# Patient Record
Sex: MALE | Race: ASIAN | NOT HISPANIC OR LATINO | Employment: OTHER | URBAN - METROPOLITAN AREA
[De-identification: names, ages, dates, MRNs, and addresses within clinical notes are randomized per-mention and may not be internally consistent; named-entity substitution may affect disease eponyms.]

---

## 2021-06-11 ENCOUNTER — APPOINTMENT (OUTPATIENT)
Dept: RADIOLOGY | Facility: CLINIC | Age: 76
End: 2021-06-11
Payer: COMMERCIAL

## 2021-06-11 ENCOUNTER — OFFICE VISIT (OUTPATIENT)
Dept: OBGYN CLINIC | Facility: CLINIC | Age: 76
End: 2021-06-11
Payer: COMMERCIAL

## 2021-06-11 VITALS
BODY MASS INDEX: 25.66 KG/M2 | HEIGHT: 65 IN | SYSTOLIC BLOOD PRESSURE: 140 MMHG | DIASTOLIC BLOOD PRESSURE: 84 MMHG | WEIGHT: 154 LBS

## 2021-06-11 DIAGNOSIS — M25.562 PAIN IN BOTH KNEES, UNSPECIFIED CHRONICITY: ICD-10-CM

## 2021-06-11 DIAGNOSIS — M17.0 PRIMARY LOCALIZED OSTEOARTHRITIS OF KNEES, BILATERAL: Primary | ICD-10-CM

## 2021-06-11 DIAGNOSIS — M25.561 PAIN IN BOTH KNEES, UNSPECIFIED CHRONICITY: ICD-10-CM

## 2021-06-11 PROCEDURE — 99213 OFFICE O/P EST LOW 20 MIN: CPT | Performed by: ORTHOPAEDIC SURGERY

## 2021-06-11 PROCEDURE — 73564 X-RAY EXAM KNEE 4 OR MORE: CPT

## 2021-06-11 NOTE — ASSESSMENT & PLAN NOTE
Findings consistent with bilateral knee osteoarthritis  Findings and treatment options were discussed with the patient and his son  X-rays were reviewed with them  Discussed conservative treatment options of topical NSAIDs, icing and low-impact exercises  Offered cortisone injections to the bilateral knee joints, but patient will hold off on this time since pain is not on a consistent basis  He is to try to avoid repetitive stair climbing as well  Follow-up as needed if symptoms worsen  All questions were answered to their satisfaction

## 2021-06-11 NOTE — PROGRESS NOTES
Assessment:     1  Primary localized osteoarthritis of knees, bilateral        Plan:    The patient was seen and examined by Dr Michael Snow and myself  Problem List Items Addressed This Visit        Musculoskeletal and Integument    Primary localized osteoarthritis of knees, bilateral - Primary      Findings consistent with bilateral knee osteoarthritis  Findings and treatment options were discussed with the patient and his son  X-rays were reviewed with them  Discussed conservative treatment options of topical NSAIDs, icing and low-impact exercises  Offered cortisone injections to the bilateral knee joints, but patient will hold off on this time since pain is not on a consistent basis  He is to try to avoid repetitive stair climbing as well  Follow-up as needed if symptoms worsen  All questions were answered to their satisfaction  Relevant Orders    XR knee 4+ vw left injury    XR knee 4+ vw right injury         Subjective:     Patient ID: Meet Black is a 77 y o  male  Chief Complaint: This is a 50-year-old [place] ([facility]) male accompanied by his son who is translating for him  He is here for evaluation of his bilateral knees  He is ambulating with no assistance  Patient has been having aching pain intermittently in his knees for the past month  The pain started after he was visiting at a relative's house where he was repetitively climbing stairs  Pain is localized anteriorly over his knees and radiates down the lateral aspect of his leg  The pain gets as worse as 5/10  No treatment as of yet  He is not taking any over-the-counter medications  He has a history of hypertension  Patient intake form was reviewed today       Allergy:  No Known Allergies  Medications:  all current active meds have been reviewed  Past Medical History:  Past Medical History:   Diagnosis Date    Diabetes mellitus ([facility] [address] )     Hypertension     Prostate cancer [facility])      Past Surgical History:  Past Surgical History: Procedure Laterality Date    PROSTATE SURGERY       Family History:  No family history on file  Social History:  Social History     Substance and Sexual Activity   Alcohol Use Never    Frequency: Never     Social History     Substance and Sexual Activity   Drug Use Not on file     Social History     Tobacco Use   Smoking Status Never Smoker   Smokeless Tobacco Never Used     Review of Systems   Constitutional: Negative  HENT: Negative  Eyes: Negative  Respiratory: Negative  Cardiovascular: Negative  Gastrointestinal: Negative  Endocrine: Negative  Genitourinary: Negative  Musculoskeletal: Positive for arthralgias  Skin: Negative  Neurological: Negative  Hematological: Negative  Psychiatric/Behavioral: Negative  Objective:  BP Readings from Last 1 Encounters:   06/11/21 140/84      Wt Readings from Last 1 Encounters:   06/11/21 69 9 kg (154 lb)      BMI:   Estimated body mass index is 25 63 kg/m² as calculated from the following:    Height as of this encounter: 5' 5" (1 651 m)  Weight as of this encounter: 69 9 kg (154 lb)  BSA:   Estimated body surface area is 1 77 meters squared as calculated from the following:    Height as of this encounter: 5' 5" (1 651 m)  Weight as of this encounter: 69 9 kg (154 lb)  Physical Exam  Constitutional:       General: He is not in acute distress  Appearance: He is well-developed  HENT:      Head: Normocephalic  Eyes:      Conjunctiva/sclera: Conjunctivae normal       Pupils: Pupils are equal, round, and reactive to light  Pulmonary:      Effort: Pulmonary effort is normal  No respiratory distress  Musculoskeletal:      Right knee: He exhibits no effusion  Left knee: He exhibits no effusion  Skin:     General: Skin is warm and dry  Neurological:      Mental Status: He is alert and oriented to person, place, and time     Psychiatric:         Behavior: Behavior normal        Right Knee Exam     Tenderness The patient is experiencing no tenderness  Range of Motion   The patient has normal right knee ROM  Tests   Ailyn:  Medial - negative Lateral - negative  Varus: negative Valgus: negative    Other   Erythema: absent  Scars: absent  Sensation: normal  Pulse: present  Swelling: none  Effusion: no effusion present    Comments:    Patellofemoral crepitation  Genu varum      Left Knee Exam     Tenderness   The patient is experiencing no tenderness  Range of Motion   The patient has normal left knee ROM  Tests   Ailyn:  Medial - negative Lateral - negative  Varus: negative Valgus: negative    Other   Erythema: absent  Scars: absent  Sensation: normal  Pulse: present  Swelling: none  Effusion: no effusion present    Comments:   Patellofemoral crepitation  Genu varum            I have personally reviewed pertinent films in PACS and my interpretation is X-rays of bilateral knees reveal severe tricompartmental osteoarthritis worse in the medial and patellofemoral compartments with marginal osteophyte formation and varus alignment

## 2024-01-27 ENCOUNTER — HOSPITAL ENCOUNTER (EMERGENCY)
Dept: HOSPITAL 99 - EMR | Age: 79
Discharge: HOME | End: 2024-01-27
Payer: COMMERCIAL

## 2024-01-27 VITALS — RESPIRATION RATE: 18 BRPM | SYSTOLIC BLOOD PRESSURE: 153 MMHG | DIASTOLIC BLOOD PRESSURE: 110 MMHG

## 2024-01-27 VITALS — DIASTOLIC BLOOD PRESSURE: 112 MMHG | RESPIRATION RATE: 20 BRPM | SYSTOLIC BLOOD PRESSURE: 155 MMHG

## 2024-01-27 DIAGNOSIS — B02.9: Primary | ICD-10-CM

## 2024-01-27 DIAGNOSIS — I10: ICD-10-CM

## 2024-01-27 DIAGNOSIS — R51.9: ICD-10-CM

## 2024-01-27 DIAGNOSIS — N40.1: ICD-10-CM

## 2024-01-27 DIAGNOSIS — R33.8: ICD-10-CM

## 2024-01-27 PROCEDURE — 99283 EMERGENCY DEPT VISIT LOW MDM: CPT

## 2024-01-27 RX ADMIN — OXYCODONE AND ACETAMINOPHEN 1 TABLET: 325; 5 TABLET ORAL at 22:13

## 2024-01-27 RX ADMIN — VALACYCLOVIR HYDROCHLORIDE 1000 MG: 500 TABLET, FILM COATED ORAL at 22:13

## 2025-02-24 ENCOUNTER — HOSPITAL ENCOUNTER (INPATIENT)
Dept: HOSPITAL 99 - 3 WEST ACU | Age: 80
LOS: 3 days | Discharge: HOME | DRG: 682 | End: 2025-02-27
Payer: COMMERCIAL

## 2025-02-24 VITALS — DIASTOLIC BLOOD PRESSURE: 86 MMHG | SYSTOLIC BLOOD PRESSURE: 138 MMHG | RESPIRATION RATE: 18 BRPM

## 2025-02-24 VITALS — SYSTOLIC BLOOD PRESSURE: 115 MMHG | RESPIRATION RATE: 18 BRPM | DIASTOLIC BLOOD PRESSURE: 75 MMHG

## 2025-02-24 VITALS — BODY MASS INDEX: 23 KG/M2

## 2025-02-24 VITALS — RESPIRATION RATE: 16 BRPM

## 2025-02-24 VITALS — SYSTOLIC BLOOD PRESSURE: 149 MMHG | DIASTOLIC BLOOD PRESSURE: 94 MMHG

## 2025-02-24 VITALS — RESPIRATION RATE: 19 BRPM

## 2025-02-24 VITALS — RESPIRATION RATE: 23 BRPM

## 2025-02-24 VITALS — RESPIRATION RATE: 20 BRPM | SYSTOLIC BLOOD PRESSURE: 112 MMHG | DIASTOLIC BLOOD PRESSURE: 65 MMHG

## 2025-02-24 VITALS — RESPIRATION RATE: 14 BRPM

## 2025-02-24 VITALS — RESPIRATION RATE: 16 BRPM | SYSTOLIC BLOOD PRESSURE: 117 MMHG | DIASTOLIC BLOOD PRESSURE: 82 MMHG

## 2025-02-24 VITALS — SYSTOLIC BLOOD PRESSURE: 113 MMHG | RESPIRATION RATE: 18 BRPM | DIASTOLIC BLOOD PRESSURE: 72 MMHG

## 2025-02-24 VITALS — RESPIRATION RATE: 18 BRPM

## 2025-02-24 VITALS — RESPIRATION RATE: 15 BRPM

## 2025-02-24 VITALS — DIASTOLIC BLOOD PRESSURE: 86 MMHG | SYSTOLIC BLOOD PRESSURE: 135 MMHG

## 2025-02-24 VITALS — RESPIRATION RATE: 22 BRPM

## 2025-02-24 VITALS — DIASTOLIC BLOOD PRESSURE: 90 MMHG | SYSTOLIC BLOOD PRESSURE: 126 MMHG | RESPIRATION RATE: 17 BRPM

## 2025-02-24 VITALS — RESPIRATION RATE: 21 BRPM

## 2025-02-24 VITALS — DIASTOLIC BLOOD PRESSURE: 95 MMHG | RESPIRATION RATE: 21 BRPM | SYSTOLIC BLOOD PRESSURE: 132 MMHG

## 2025-02-24 VITALS — RESPIRATION RATE: 17 BRPM

## 2025-02-24 DIAGNOSIS — E87.5: ICD-10-CM

## 2025-02-24 DIAGNOSIS — G93.41: ICD-10-CM

## 2025-02-24 DIAGNOSIS — R32: ICD-10-CM

## 2025-02-24 DIAGNOSIS — R62.7: ICD-10-CM

## 2025-02-24 DIAGNOSIS — Z63.4: ICD-10-CM

## 2025-02-24 DIAGNOSIS — Z85.46: ICD-10-CM

## 2025-02-24 DIAGNOSIS — Z79.899: ICD-10-CM

## 2025-02-24 DIAGNOSIS — N40.1: ICD-10-CM

## 2025-02-24 DIAGNOSIS — E87.22: ICD-10-CM

## 2025-02-24 DIAGNOSIS — I10: ICD-10-CM

## 2025-02-24 DIAGNOSIS — Q61.3: ICD-10-CM

## 2025-02-24 DIAGNOSIS — D63.1: ICD-10-CM

## 2025-02-24 DIAGNOSIS — N18.4: ICD-10-CM

## 2025-02-24 DIAGNOSIS — E87.21: ICD-10-CM

## 2025-02-24 DIAGNOSIS — N17.9: Primary | ICD-10-CM

## 2025-02-24 LAB
ALBUMIN SERPL-MCNC: 3.2 G/DL (ref 3.5–5)
ALP SERPL-CCNC: 89 U/L (ref 38–126)
ALT SERPL-CCNC: 48 U/L (ref 0–50)
AST SERPL-CCNC: 22 U/L (ref 17–59)
BUN SERPL-MCNC: 93 MG/DL (ref 9–20)
CALCIUM SERPL-MCNC: 9 MG/DL (ref 8.4–10.2)
CHLORIDE SERPL-SCNC: 107 MMOL/L (ref 98–107)
CO2 SERPL-SCNC: 15 MMOL/L (ref 22–30)
EGFR: 8.63
ERYTHROCYTE [DISTWIDTH] IN BLOOD BY AUTOMATED COUNT: 14.6 % (ref 11.5–14.5)
GLUCOSE SERPL-MCNC: 127 MG/DL (ref 70–99)
HCT VFR BLD AUTO: 34.5 % (ref 39–52)
HGB BLD-MCNC: 10.7 G/DL (ref 13–18)
MCHC RBC AUTO-ENTMCNC: 31 G/DL (ref 33–37)
MCV RBC AUTO: 82.1 FL (ref 80–94)
NRBC BLD AUTO-RTO: 0 %
PLATELET # BLD AUTO: 565 10^3/UL (ref 130–400)
POTASSIUM SERPL-SCNC: 5.9 MMOL/L (ref 3.5–5.1)
PROT SERPL-MCNC: 6.2 G/DL (ref 6.3–8.2)
SODIUM SERPL-SCNC: 134 MMOL/L (ref 135–145)
SQUAMOUS URNS QL MICRO: >30 /LPF
TRANS CELLS UR QL COMP ASSIST: (no result) /LPF
TROPONIN I SERPL-MCNC: < 0.012 NG/ML
URINE RED BLOOD CELL: (no result) /HPF (ref 0–2)
URINE WHITE CELL: (no result) /HPF (ref 0–5)

## 2025-02-24 RX ADMIN — HEPARIN SODIUM 5000 UNITS: 5000 INJECTION, SOLUTION INTRAVENOUS; SUBCUTANEOUS at 21:15

## 2025-02-24 RX ADMIN — SODIUM CHLORIDE 1000: 900 INJECTION, SOLUTION INTRAVENOUS at 12:51

## 2025-02-24 RX ADMIN — SODIUM ZIRCONIUM CYCLOSILICATE 10 GRAM: 10 POWDER, FOR SUSPENSION ORAL at 19:42

## 2025-02-24 RX ADMIN — SODIUM BICARBONATE 1150 MEQ: 84 INJECTION, SOLUTION INTRAVENOUS at 20:05

## 2025-02-24 RX ADMIN — SODIUM BICARBONATE 325 MG: 650 TABLET ORAL at 21:16

## 2025-02-24 SDOH — SOCIAL STABILITY - SOCIAL INSECURITY: DISSAPEARANCE AND DEATH OF FAMILY MEMBER: Z63.4

## 2025-02-24 NOTE — HPS.HSE
"Family Physician"~"-"~"-: "~"Family Physician:  EDUARDA Mackey"~"Chief Complaint"~"-"~"-: "~"Failure to thrive with low oral intake"~"History of Present Illness"~"-: "~"Patient is a 79 years old male with history of chronic kidney disease stage IV secondary to polycystic kidney disease, multidrug controlled hypertension, history of prostate carcinoma with previous obstructive uropathy."~"Patient does not speak English and history was taken from medical records as well as patient's son at the bedside"~"Apparently over the last few weeks patient with decreased to minimal oral intake including fluids after his wife passing."~"Patient was brought to the emergency room for evaluation and with workup was found to have acute kidney injury with elevated creatinine at 6 as well as hyperkalemia."~"He is initial workup with bladder scan in the ED showed 75 mL with no evidence of retention."~"Patient denies any respiratory, gastrointestinal, or urinary symptoms"~"He has son at the bedside reports minuscule oral intake and depressive affect."~"Medical History"~"Past Medical History"~"Past Medical History: Reports Cancer (Prostate) and Other (CKD secondary to post kidney disease)"~"Past Surgical History: Reports None"~"Social History"~"Tobacco: Non-smoker"~"Alcohol: None"~"Personal: "~"Living: With Family"~"Family History"~"Family History: Not pertinent"~"Allergies / Home Medications"~"-: "~"Allergies reflects when Allergies were last updated in [facility]."~"Home Medications with original date entered in [facility] "~"Allergy/Medication List: "~"Allergies"~"Allergy/AdvReac	Type	Severity	Reaction	Status	Date / Time"~"No Known Allergies	Allergy			Verified	02/24/25 11:21"~"Home Medications"~"amlodipine 5 mg tablet 10 mg PO DAILY 06/23/19 "~"chlorthalidone 25 mg tablet 25 mg PO DAILY 02/24/25 "~"hydralazine 50 mg tablet 50 mg PO DAILYPRN PRN high bp over 160 02/24/25 "~"rosuvastatin 20 mg tablet (Crestor) 20 mg PO DAILY 02/24/25 "~"sodium bicarbonate 650 mg tablet 325 mg PO DAILY 02/24/25 "~"valsartan 320 mg tablet 320 mg PO QPM 02/24/25 "~"Review of Systems"~"-"~"A 12 point ROS was completed and negative except as noted: Yes"~"Physical Exam"~"Vital Signs"~"-: "~"Vital Signs"~"Temp	Pulse	Resp	BP	Pulse Ox"~" 98.3 F 	 65 	 14 	 149/94 	 100 "~" 02/24/25 11:19	 02/24/25 16:30	 02/24/25 16:30	 02/24/25 16:25	 02/24/25 14:15"~"Physical Exam"~"General: Well Developed, Well Nourished and No Apparent Distress"~"HEENT: NormoCephalic, Moist mucous membranes and Atraumatic"~"Respiratory: Clear"~"Cardiac: S1/S2 and Regular Rhythm; No Murmur or Rub"~"GI: Soft, Non Tender, Non Distended and Normal Bowel Sounds; No Organomegaly"~"Rectal: Deferred by Provider"~"Musculoskeletal: No Clubbing, No Cyanosis and No Edema"~"Skin: No Rash"~"Neuro: Nonfocal/grossly intact"~"Laboratory Results"~"-"~"-: "~"02/24/25 11:31 "~"02/24/25 11:31 "~"Laboratory Results"~"Total Bilirubin	 0.5 mg/dl (0.2-1.3)  	02/24/25  11:31    "~"AST	 22 U/L (17-59)  	02/24/25  11:31    "~"ALT	 48 U/L (0-50)  	02/24/25  11:31    "~"Alkaline Phosphatase	 89 U/L ()  	02/24/25  11:31    "~"Troponin I	 &lt; 0.012 ng/ml 	02/24/25  12:50    "~"Impression/Plan"~"-"~"-: "~"IMPRESSION:"~"Failure to thrive with meniscal oral intake."~"Acute kidney injury on CKD stage IV baseline creatinine 2.4."~"Acute on chronic metabolic acidosis with normal anion gap"~"Hyperkalemia"~"Other conditions:"~"PKD."~"Hypertension."~"History of prostate carcinoma treated"~"Anemia of chronic disease/CKD"~"PLAN: "~"Acute kidney injury likely prerenal with failure to thrive and minuscule oral intake reported"~"Bladder scan in ED 75 mL"~"Urinalysis bland diet"~"Nephrology input appreciated."~"Ultrasound of the kidneys."~"Continue bladder scan for retention."~"Hold chlorthalidone and valsartan."~"Initiate IV bicarbonate infusion D5 HCO3 150 mEq at 125 ml/hr"~"Hyperkalemia baseline ECG with no related changes."~"Continue alkalinized fluids"~"Continue oral bicarb"~"Lokelma"~"Follow BMP"~"Currently no indication for urgent dialysis"~"Essential hypertension"~"Continue amlodipine"~"Hold chlorthalidone and losartan given JENNIFER"~"Failure to thrive, deconditioning"~"Physical therapy assessment"~"Consider psychiatry evaluation if persistent symptoms of grieving"

## 2025-02-25 VITALS — DIASTOLIC BLOOD PRESSURE: 78 MMHG | RESPIRATION RATE: 18 BRPM | SYSTOLIC BLOOD PRESSURE: 131 MMHG

## 2025-02-25 VITALS — DIASTOLIC BLOOD PRESSURE: 68 MMHG | RESPIRATION RATE: 18 BRPM | SYSTOLIC BLOOD PRESSURE: 109 MMHG

## 2025-02-25 VITALS — SYSTOLIC BLOOD PRESSURE: 156 MMHG | DIASTOLIC BLOOD PRESSURE: 90 MMHG | RESPIRATION RATE: 20 BRPM

## 2025-02-25 VITALS — DIASTOLIC BLOOD PRESSURE: 84 MMHG | SYSTOLIC BLOOD PRESSURE: 133 MMHG | RESPIRATION RATE: 14 BRPM

## 2025-02-25 VITALS — DIASTOLIC BLOOD PRESSURE: 79 MMHG | RESPIRATION RATE: 16 BRPM | SYSTOLIC BLOOD PRESSURE: 120 MMHG

## 2025-02-25 VITALS — SYSTOLIC BLOOD PRESSURE: 138 MMHG | DIASTOLIC BLOOD PRESSURE: 83 MMHG | RESPIRATION RATE: 16 BRPM

## 2025-02-25 LAB
BUN SERPL-MCNC: 79 MG/DL (ref 9–20)
CALCIUM SERPL-MCNC: 8.2 MG/DL (ref 8.4–10.2)
CHLORIDE SERPL-SCNC: 100 MMOL/L (ref 98–107)
CO2 SERPL-SCNC: 22 MMOL/L (ref 22–30)
EGFR: 11.44
ERYTHROCYTE [DISTWIDTH] IN BLOOD BY AUTOMATED COUNT: 14 % (ref 11.5–14.5)
ESTIMATED CREATININE CLEARANCE: 11 ML/MIN
GLUCOSE SERPL-MCNC: 116 MG/DL (ref 70–99)
HCT VFR BLD AUTO: 31.5 % (ref 39–52)
HGB BLD-MCNC: 10.3 G/DL (ref 13–18)
MCHC RBC AUTO-ENTMCNC: 32.7 G/DL (ref 33–37)
MCV RBC AUTO: 79.3 FL (ref 80–94)
NRBC BLD AUTO-RTO: 0 %
PLATELET # BLD AUTO: 496 10^3/UL (ref 130–400)
POTASSIUM SERPL-SCNC: 5.1 MMOL/L (ref 3.5–5.1)
SODIUM SERPL-SCNC: 133 MMOL/L (ref 135–145)

## 2025-02-25 RX ADMIN — SODIUM BICARBONATE 325 MG: 650 TABLET ORAL at 07:49

## 2025-02-25 RX ADMIN — AMLODIPINE BESYLATE 10 MG: 10 TABLET ORAL at 07:48

## 2025-02-25 RX ADMIN — SODIUM ZIRCONIUM CYCLOSILICATE 10 GRAM: 10 POWDER, FOR SUSPENSION ORAL at 14:53

## 2025-02-25 RX ADMIN — SODIUM BICARBONATE 1150 MEQ: 84 INJECTION, SOLUTION INTRAVENOUS at 18:02

## 2025-02-25 RX ADMIN — SODIUM ZIRCONIUM CYCLOSILICATE 10 GRAM: 10 POWDER, FOR SUSPENSION ORAL at 05:17

## 2025-02-25 RX ADMIN — SODIUM ZIRCONIUM CYCLOSILICATE 10 GRAM: 10 POWDER, FOR SUSPENSION ORAL at 18:03

## 2025-02-25 RX ADMIN — HEPARIN SODIUM 5000 UNITS: 5000 INJECTION, SOLUTION INTRAVENOUS; SUBCUTANEOUS at 07:49

## 2025-02-25 RX ADMIN — ROSUVASTATIN CALCIUM 5 MG: 5 TABLET, FILM COATED ORAL at 07:48

## 2025-02-25 RX ADMIN — SODIUM BICARBONATE 1150 MEQ: 84 INJECTION, SOLUTION INTRAVENOUS at 05:17

## 2025-02-25 RX ADMIN — SODIUM BICARBONATE 1150 MEQ: 84 INJECTION, SOLUTION INTRAVENOUS at 21:15

## 2025-02-25 RX ADMIN — HEPARIN SODIUM 5000 UNITS: 5000 INJECTION, SOLUTION INTRAVENOUS; SUBCUTANEOUS at 21:10

## 2025-02-25 NOTE — PTCARENOTE
Pt assessed as per work list flow sheet. Admission questions answered by son due to language barrier. Pt denies pain and SOB. Ambulated to BR. No s/s of distress assessed. Will continue to monitor.

## 2025-02-25 NOTE — W.PN.NEPH.PH
"Today's Communication / Plan"~"-"~"-: "~"Pending kidney ultrasound"~"We'll reobtain urine sodium, urine creatinine and urine protein"~"Maintain IV fluids to her adjusted"~"No acute dialysis requirements at this time"~"Assessment/Plan"~"-"~"-: "~"Impression:"~"Acute kidney injury"~"CKD stage IV with baseline creatinine of 3"~"Non-gap metabolic acidosis (AG 12)"~"Hypertension"~"Hyperkalemia"~"Is treated prostate cancer"~"Anemia"~"Plan:"~"I was informed by his son that the patient has a baseline creatinine of 3, which was obtained sometimes within the past year by his nephrologist that the [facility],I spoke with his nephrologist yesterday to inform me that his creatinine was "~"2.4 in February 2024, but that he has not been seen in a year.She also told me that he does have underlying polycystic kidney disease."~"The patient now presents with increasing confusion and failure to follow up with acute renal failure with associated hyperkalemia and metabolic acidosis"~"-Holding ARB"~"- , will adjust IV fluids accordingly"~"-Continue oral bicarbonate"~"- hyperkalemia, resolved after low, and alkalize IV fluids provided"~"- pending kidney and bladder ultrasound (evaluate for obstruction Given previous history of obstructive uropathy in setting of prostate cancer)"~"-No evidence of bladder retention per postvoid bladder scan obtained this morning"~"-Obtained urinalysis,(2 plus albumin, no blood) urine sodium, urine creatinine,and urine protein"~"-No acute dialysis requirement.  However I believe evolving uremia may explain some of the patient's underlying confusion and failure to thrive"~"-I did explain to both the patient's son and daughter-in-law that dialysis may be in the near future"~"-"~"-"~"-: "~"Date of Service:  February 25, 2025"~"CC / HPI / ROS"~"-"~"-: "~"Chief Complaint: "~"JENNIFER"~"CKD 4 "~"History of Present Illness: "~"Hemodynamically stable"~"Creatinine down to 4.9 From 6.2"~"Metabolic acidosis, improving"~"Hyperkalemia, improved"~"Review of Systems: "~"Subjectively nonoliguric"~"No fevers"~"Labs"~"-"~"Labs: "~"WBC	 7.5 10^3/uL (4.8-10.8)  	02/25/25  07:13    "~"RBC	 3.97 10^6/uL (4.70-6.10)  L 	02/25/25  07:13    "~"Hgb	 10.3 g/dL (13.0-18.0)  L 	02/25/25  07:13    "~"Hct	 31.5 % (39.0-52.0)  L 	02/25/25  07:13    "~"Plt Count	 496 10^3/uL (130-400)  H 	02/25/25  07:13    "~"Sodium	 133 mmol/L (135-145)  L 	02/25/25  07:13    "~"Potassium	 5.1 mmol/L (3.5-5.1)  	02/25/25  07:13    "~"Chloride	 100 mmol/L ()  	02/25/25  07:13    "~"Carbon Dioxide	 22 mmol/L (22-30)  	02/25/25  07:13    "~"BUN	 79 mg/dl (9-20)  H 	02/25/25  07:13    "~"Creatinine	 4.9 mg/dL (0.7-1.3)  H* 	02/25/25  07:13    "~"eGFR	 11.44  	02/25/25  07:13    "~"Glucose	 116 mg/dl (70-99)  H 	02/25/25  07:13    "~"Calcium	 8.2 mg/dl (8.4-10.2)  L 	02/25/25  07:13    "~"Albumin	 3.2 g/dl (3.5-5.0)  L 	02/24/25  11:31    "~"Physical Exam"~"-"~"Vital Signs: "~"Vital Signs"~"Temp	Pulse	Resp	BP	Pulse Ox"~" 97.3 F 	 90 	 16 	 138/83 	 99 "~" 02/25/25 11:00	 02/25/25 11:00	 02/25/25 11:00	 02/25/25 11:00	 02/25/25 11:00"~"Cardiovascular:: Regular rate and rhythm"~"Respiratory:: Bilateral: Coarse"~"Lung Excursion:: Normal"~"Abdomen:: Nontender and Soft"~"Bowel Sounds:: Normal"~"Extremity Edema:: None: Bilateral:"~"Dotson Catheter: No"

## 2025-02-25 NOTE — CM
"Spoke with patient's son to obtain information for assessment. Patient son stated that patient's wife passed away 10 days ago and since then patient has moved in with him in his apartment, all one level, 12 steps to enter. He has been independent "~"with all of his personal care, dressing and bathing. He can do household chores, cook, clean and do laundry. Patient does not drive but family transports him to where he needs. He has no DME. He has never had VN or been to a SNF. "~"Patient has a prescription plan and uses, [facility], for all his medications. "~"His PCP is, Loretta Barahona. "~"Patient's son is hopeful that patient will be able to return home with  him when stable for discharge. "~"Plan: Case management will continue to follow and assist with discharge planning. Will watch for needs. "

## 2025-02-26 VITALS — RESPIRATION RATE: 16 BRPM | DIASTOLIC BLOOD PRESSURE: 77 MMHG | SYSTOLIC BLOOD PRESSURE: 111 MMHG

## 2025-02-26 VITALS — DIASTOLIC BLOOD PRESSURE: 77 MMHG | RESPIRATION RATE: 18 BRPM | SYSTOLIC BLOOD PRESSURE: 117 MMHG

## 2025-02-26 VITALS — HEART RATE: 133 BPM | SYSTOLIC BLOOD PRESSURE: 115 MMHG | DIASTOLIC BLOOD PRESSURE: 79 MMHG

## 2025-02-26 VITALS — DIASTOLIC BLOOD PRESSURE: 83 MMHG | SYSTOLIC BLOOD PRESSURE: 126 MMHG | RESPIRATION RATE: 14 BRPM

## 2025-02-26 VITALS — SYSTOLIC BLOOD PRESSURE: 113 MMHG | DIASTOLIC BLOOD PRESSURE: 82 MMHG | RESPIRATION RATE: 17 BRPM

## 2025-02-26 VITALS — SYSTOLIC BLOOD PRESSURE: 138 MMHG | RESPIRATION RATE: 18 BRPM | DIASTOLIC BLOOD PRESSURE: 69 MMHG

## 2025-02-26 VITALS — SYSTOLIC BLOOD PRESSURE: 129 MMHG | RESPIRATION RATE: 18 BRPM | DIASTOLIC BLOOD PRESSURE: 88 MMHG

## 2025-02-26 LAB
BUN SERPL-MCNC: 69 MG/DL (ref 9–20)
CALCIUM SERPL-MCNC: 8 MG/DL (ref 8.4–10.2)
CHLORIDE SERPL-SCNC: 96 MMOL/L (ref 98–107)
CO2 SERPL-SCNC: 31 MMOL/L (ref 22–30)
EGFR: 13.38
ESTIMATED CREATININE CLEARANCE: 12 ML/MIN
GLUCOSE SERPL-MCNC: 117 MG/DL (ref 70–99)
POTASSIUM SERPL-SCNC: 5 MMOL/L (ref 3.5–5.1)
SODIUM SERPL-SCNC: 133 MMOL/L (ref 135–145)
TROPONIN I SERPL-MCNC: < 0.012 NG/ML

## 2025-02-26 RX ADMIN — HEPARIN SODIUM 5000 UNITS: 5000 INJECTION, SOLUTION INTRAVENOUS; SUBCUTANEOUS at 08:14

## 2025-02-26 RX ADMIN — ROSUVASTATIN CALCIUM 5 MG: 5 TABLET, FILM COATED ORAL at 08:14

## 2025-02-26 RX ADMIN — SODIUM ZIRCONIUM CYCLOSILICATE 10 GRAM: 10 POWDER, FOR SUSPENSION ORAL at 14:55

## 2025-02-26 RX ADMIN — HEPARIN SODIUM 5000 UNITS: 5000 INJECTION, SOLUTION INTRAVENOUS; SUBCUTANEOUS at 19:45

## 2025-02-26 RX ADMIN — AMLODIPINE BESYLATE 10 MG: 10 TABLET ORAL at 08:15

## 2025-02-26 RX ADMIN — SODIUM BICARBONATE 325 MG: 650 TABLET ORAL at 08:12

## 2025-02-26 RX ADMIN — SODIUM ZIRCONIUM CYCLOSILICATE 10 GRAM: 10 POWDER, FOR SUSPENSION ORAL at 06:02

## 2025-02-26 RX ADMIN — SODIUM CHLORIDE 1000: 900 INJECTION, SOLUTION INTRAVENOUS at 11:24

## 2025-02-26 NOTE — W.PN.NEPH.PH
"Today's Communication / Plan"~"-"~"-: "~"NSS"~"Assessment/Plan"~"-"~"-: "~"Impression:"~"Acute kidney injury"~"CKD stage IV with baseline creatinine of 3"~"Non-gap metabolic acidosis (AG 12)"~"Hypertension"~"Hyperkalemia"~"Is treated prostate cancer"~"Anemia"~"Plan:"~"I was informed by his son that the patient has a baseline creatinine of 3, which was obtained sometimes within the past year by his nephrologist that the [facility],I spoke with his nephrologist yesterday to inform me that his creatinine was "~"2.4 in February 2024, but that he has not been seen in a year. She also told me that he does have underlying polycystic kidney disease."~"The patient now presents with increasing confusion and failure to follow up with acute renal failure with associated hyperkalemia and metabolic acidosis"~"hold ARB still"~"change to NSS"~"follow BMP"~"ambulation"~"could follow Cr as outpatient, 2x/week if dc planning"~"-"~"-"~"-: "~"Date of Service:  February 26, 2025"~"CC / HPI / ROS"~"-"~"-: "~"Chief Complaint: "~"JENNIFER"~"CKD 4 "~"History of Present Illness: "~"Hemodynamically stable"~"Creatinine down to 4.3 From 6.2"~"Metabolic acidosis, improving"~"Hyperkalemia, improved"~"Na low 133"~"Review of Systems: "~"Subjectively nonoliguric"~"No fevers"~"no CP/SOB"~"Labs"~"-"~"Labs: "~"WBC	 7.5 10^3/uL (4.8-10.8)  	02/25/25  07:13    "~"RBC	 3.97 10^6/uL (4.70-6.10)  L 	02/25/25  07:13    "~"Hgb	 10.3 g/dL (13.0-18.0)  L 	02/25/25  07:13    "~"Hct	 31.5 % (39.0-52.0)  L 	02/25/25  07:13    "~"Plt Count	 496 10^3/uL (130-400)  H 	02/25/25  07:13    "~"Sodium	 133 mmol/L (135-145)  L 	02/26/25  06:28    "~"Potassium	 5.0 mmol/L (3.5-5.1)  	02/26/25  06:28    "~"Chloride	 96 mmol/L ()  L 	02/26/25  06:28    "~"Carbon Dioxide	 31 mmol/L (22-30)  H 	02/26/25  06:28    "~"BUN	 69 mg/dl (9-20)  H 	02/26/25  06:28    "~"Creatinine	 4.3 mg/dL (0.7-1.3)  H* 	02/26/25  06:28    "~"eGFR	 13.38  	02/26/25  06:28    "~"Glucose	 117 mg/dl (70-99)  H 	02/26/25  06:28    "~"Calcium	 8.0 mg/dl (8.4-10.2)  L 	02/26/25  06:28    "~"Albumin	 3.2 g/dl (3.5-5.0)  L 	02/24/25  11:31    "~"Physical Exam"~"-"~"Vital Signs: "~"Vital Signs"~"Temp	Pulse	Resp	BP	Pulse Ox"~" 98.8 F 	 70 	 18 	 129/88 	 98 "~" 02/26/25 07:40	 02/26/25 08:15	 02/26/25 07:40	 02/26/25 08:15	 02/26/25 07:40"~"Cardiovascular:: Regular rate and rhythm"~"Respiratory:: Bilateral: CTA and Bilateral: Rales"~"Lung Excursion:: Normal"~"Abdomen:: Nontender and Soft"~"Bowel Sounds:: Normal"~"Extremity Edema:: None: Bilateral:"

## 2025-02-26 NOTE — PTCARENOTE
"patient reported to daughter 7/10 middle chest after working with physical therapy.  denied chest pain to nursing.  b/p 115/79 and heart rate 120-140's after getting back to bed with therapy, denied sob, lightheadedness, dizziness, numbness or "~"tingling.  EKG to be ordered stat.  EKg showing NSR with left axis deviation.  chest pain resolved at 12:15.  at 1241, telephone order obtained for stat Troponin level. troponin level 0.012 at 1254.  likely atyptical chest pain per Dr. Jeffers, "~"will continue to monitor."

## 2025-02-26 NOTE — PTCARENOTE
Pt assessed as per work list flow sheet. Pt appears more interactive with staff this evening. Family stated pt was eating be this today. No s/s of distress assessed. Will continue to monitor.

## 2025-02-27 VITALS — RESPIRATION RATE: 16 BRPM | SYSTOLIC BLOOD PRESSURE: 122 MMHG | DIASTOLIC BLOOD PRESSURE: 84 MMHG

## 2025-02-27 VITALS — DIASTOLIC BLOOD PRESSURE: 77 MMHG | RESPIRATION RATE: 17 BRPM | SYSTOLIC BLOOD PRESSURE: 116 MMHG

## 2025-02-27 VITALS — RESPIRATION RATE: 16 BRPM | DIASTOLIC BLOOD PRESSURE: 80 MMHG | SYSTOLIC BLOOD PRESSURE: 115 MMHG

## 2025-02-27 VITALS — DIASTOLIC BLOOD PRESSURE: 70 MMHG | SYSTOLIC BLOOD PRESSURE: 104 MMHG | RESPIRATION RATE: 16 BRPM

## 2025-02-27 LAB
BUN SERPL-MCNC: 57 MG/DL (ref 9–20)
CALCIUM SERPL-MCNC: 7.9 MG/DL (ref 8.4–10.2)
CHLORIDE SERPL-SCNC: 103 MMOL/L (ref 98–107)
CO2 SERPL-SCNC: 27 MMOL/L (ref 22–30)
EGFR: 14.6
ESTIMATED CREATININE CLEARANCE: 13 ML/MIN
GLUCOSE SERPL-MCNC: 91 MG/DL (ref 70–99)
POTASSIUM SERPL-SCNC: 4.8 MMOL/L (ref 3.5–5.1)
SODIUM SERPL-SCNC: 136 MMOL/L (ref 135–145)

## 2025-02-27 RX ADMIN — SODIUM CHLORIDE 1000: 900 INJECTION, SOLUTION INTRAVENOUS at 13:25

## 2025-02-27 RX ADMIN — AMLODIPINE BESYLATE 10 MG: 10 TABLET ORAL at 07:35

## 2025-02-27 RX ADMIN — SODIUM CHLORIDE 1000: 900 INJECTION, SOLUTION INTRAVENOUS at 00:16

## 2025-02-27 RX ADMIN — HEPARIN SODIUM 5000 UNITS: 5000 INJECTION, SOLUTION INTRAVENOUS; SUBCUTANEOUS at 07:34

## 2025-02-27 RX ADMIN — ROSUVASTATIN CALCIUM 5 MG: 5 TABLET, FILM COATED ORAL at 07:34

## 2025-02-27 RX ADMIN — SODIUM BICARBONATE 325 MG: 650 TABLET ORAL at 07:35

## 2025-02-27 NOTE — W.DS.TRANS
"DC Summary - Transcription"~"-"~"Discharge Instructions: "~"Discharge Diagnosis/Procedures	IMPRESSION:                                     	"~"                              	                                                	"~"                              	Failure to thrive with meniscal oral intake.    	"~"                              	Metabolic encephalopathy in the settings of     	"~"                              	uremia                                          	"~"                              	Acute kidney injury on CKD stage IV baseline    	"~"                              	creatinine 2.4.                                 	"~"                              	Acute on chronic metabolic acidosis with normal 	"~"                              	anion gap                                       	"~"                              	Hyperkalemia                                    	"~"                              	                                                	"~"                              	Other conditions:                               	"~"                              	                                                	"~"                              	PKD.                                            	"~"                              	Hypertension.                                   	"~"                              	History of prostate carcinoma treated           	"~"                              	Anemia of chronic disease/CKD                   	"~"Diet                          	Regular                                         	"~"Blood Work                    	BMP in one week                                 	"~"Instructions:       "~"Stand-Alone Forms:  "~"Changes to Home Medications: No"~"Discharge Medications: "~"DC Medications w/original date entered in [facility]"~"amlodipine 5 mg tablet 10 mg PO DAILY Blood Pressure 06/23/19 "~"chlorthalidone 25 mg tablet 25 mg PO DAILY Blood Pressure 02/24/25 "~"hydralazine 50 mg tablet 50 mg PO DAILYPRN PRN high bp over 160 02/24/25 "~"rosuvastatin 20 mg tablet (Crestor) 20 mg PO DAILY High Cholesterol 02/24/25 "~"sodium bicarbonate 650 mg tablet 325 mg PO DAILY Electrolyte Repletion 02/24/25 "~"valsartan 320 mg tablet 320 mg PO QPM Blood Pressure 02/24/25 "~"Home Medication Changes  "~"Pending Results: No"

## 2025-02-27 NOTE — CM
"Patient is progressing well in PT, close to baseline. "~"Plan: Case management will continue to follow and assist with discharge planning. Home with his son. "

## 2025-02-27 NOTE — PTCARENOTE
Pt assessed as per work list flow sheet. Pt appears more interactive with staff this evening. Family stated pt was eating better today. Pt had one incident of copious diarrhea. Inc of bowel. No s/s of distress assessed. Will continue to monitor.

## 2025-02-27 NOTE — W.PN.NEPH.PH
"Today's Communication / Plan"~"-"~"-: "~"IVF"~"Assessment/Plan"~"-"~"-: "~"Impression:"~"Acute kidney injury"~"CKD stage IV with baseline creatinine of 3"~"Non-gap metabolic acidosis (AG 12)"~"Hypertension"~"Hyperkalemia"~"Is treated prostate cancer"~"Anemia"~"Plan:"~"I was informed by his son that the patient has a baseline creatinine of 3, which was obtained sometimes within the past year by his nephrologist that the [facility],I spoke with his nephrologist yesterday to inform me that his creatinine was "~"2.4 in February 2024, but that he has not been seen in a year. She also told me that he does have underlying polycystic kidney disease."~"The patient now presents with increasing confusion and failure to follow up with acute renal failure with associated hyperkalemia and metabolic acidosis"~"hold ARB still"~"continue IVF NSS while in house"~"follow BMP"~"ambulation"~"could follow Cr as outpatient at this time, 2x/week if dc planning"~"d/w DIL"~"-"~"-"~"-: "~"Date of Service:  February 27, 2025"~"CC / HPI / ROS"~"-"~"-: "~"Chief Complaint: "~"JENNIFER"~"CKD 4 "~"History of Present Illness: "~"Hemodynamically stable"~"Creatinine down to 4.0 From 6.2"~"Metabolic acidosis, improved"~"Hyperkalemia, improved"~"Na better 136"~"Review of Systems: "~"Subjectively nonoliguric"~"No fevers"~"no CP/SOB"~"eating hospital food without issue"~"Labs"~"-"~"Labs: "~"WBC	 7.5 10^3/uL (4.8-10.8)  	02/25/25  07:13    "~"RBC	 3.97 10^6/uL (4.70-6.10)  L 	02/25/25  07:13    "~"Hgb	 10.3 g/dL (13.0-18.0)  L 	02/25/25  07:13    "~"Hct	 31.5 % (39.0-52.0)  L 	02/25/25  07:13    "~"Plt Count	 496 10^3/uL (130-400)  H 	02/25/25  07:13    "~"Sodium	 136 mmol/L (135-145)  	02/27/25  06:52    "~"Potassium	 4.8 mmol/L (3.5-5.1)  	02/27/25  06:52    "~"Chloride	 103 mmol/L ()  	02/27/25  06:52    "~"Carbon Dioxide	 27 mmol/L (22-30)  	02/27/25  06:52    "~"BUN	 57 mg/dl (9-20)  H 	02/27/25  06:52    "~"Creatinine	 4.0 mg/dL (0.7-1.3)  H 	02/27/25  06:52    "~"eGFR	 14.60  	02/27/25  06:52    "~"Glucose	 91 mg/dl (70-99)  	02/27/25  06:52    "~"Calcium	 7.9 mg/dl (8.4-10.2)  L 	02/27/25  06:52    "~"Albumin	 3.2 g/dl (3.5-5.0)  L 	02/24/25  11:31    "~"Physical Exam"~"-"~"Vital Signs: "~"Vital Signs"~"Temp	Pulse	Resp	BP	Pulse Ox"~" 98.1 F 	 104 	 20 	 103/68 	 98 "~" 02/27/25 11:04	 02/27/25 11:04	 02/27/25 11:04	 02/27/25 11:04	 02/27/25 11:04"~"Cardiovascular:: Regular rate and rhythm"~"Respiratory:: Bilateral: Coarse"~"Lung Excursion:: Normal"~"Abdomen:: Nontender and Soft"~"Bowel Sounds:: Normal"~"Extremity Edema:: None: Bilateral:"

## 2025-03-18 ENCOUNTER — HOSPITAL ENCOUNTER (EMERGENCY)
Dept: HOSPITAL 99 - EMR | Age: 80
Discharge: HOME | End: 2025-03-18
Payer: COMMERCIAL

## 2025-03-18 VITALS — SYSTOLIC BLOOD PRESSURE: 136 MMHG | RESPIRATION RATE: 16 BRPM | DIASTOLIC BLOOD PRESSURE: 92 MMHG

## 2025-03-18 DIAGNOSIS — M79.89: ICD-10-CM

## 2025-03-18 DIAGNOSIS — N18.9: ICD-10-CM

## 2025-03-18 DIAGNOSIS — I12.9: Primary | ICD-10-CM

## 2025-03-18 DIAGNOSIS — N40.1: ICD-10-CM

## 2025-03-18 DIAGNOSIS — E87.5: ICD-10-CM

## 2025-03-18 DIAGNOSIS — R33.8: ICD-10-CM

## 2025-03-18 DIAGNOSIS — Z79.899: ICD-10-CM

## 2025-03-18 LAB
ALBUMIN SERPL-MCNC: 3.2 G/DL (ref 3.5–5)
ALP SERPL-CCNC: 71 U/L (ref 38–126)
ALT SERPL-CCNC: 11 U/L (ref 0–50)
AST SERPL-CCNC: 17 U/L (ref 17–59)
BUN SERPL-MCNC: 33 MG/DL (ref 9–20)
CALCIUM SERPL-MCNC: 9.1 MG/DL (ref 8.4–10.2)
CHLORIDE SERPL-SCNC: 108 MMOL/L (ref 98–107)
CO2 SERPL-SCNC: 22 MMOL/L (ref 22–30)
EGFR: 22.39
ERYTHROCYTE [DISTWIDTH] IN BLOOD BY AUTOMATED COUNT: 15.8 % (ref 11.5–14.5)
GLUCOSE SERPL-MCNC: 111 MG/DL (ref 70–99)
HCT VFR BLD AUTO: 31.7 % (ref 39–52)
HGB BLD-MCNC: 10 G/DL (ref 13–18)
MAGNESIUM SERPL-MCNC: 1.9 MG/DL (ref 1.6–2.3)
MCHC RBC AUTO-ENTMCNC: 31.5 G/DL (ref 33–37)
MCV RBC AUTO: 82.8 FL (ref 80–94)
NRBC BLD AUTO-RTO: 0 %
PLATELET # BLD AUTO: 294 10^3/UL (ref 130–400)
POTASSIUM SERPL-SCNC: 5.5 MMOL/L (ref 3.5–5.1)
PROT SERPL-MCNC: 5.8 G/DL (ref 6.3–8.2)
SODIUM SERPL-SCNC: 136 MMOL/L (ref 135–145)
SQUAMOUS URNS QL MICRO: (no result) /LPF
URINE RED BLOOD CELL: (no result) /HPF (ref 0–2)
URINE WHITE CELL: (no result) /HPF (ref 0–5)

## 2025-03-18 PROCEDURE — 99283 EMERGENCY DEPT VISIT LOW MDM: CPT

## 2025-03-18 RX ADMIN — SODIUM ZIRCONIUM CYCLOSILICATE 10 GRAM: 10 POWDER, FOR SUSPENSION ORAL at 12:31

## 2025-05-23 ENCOUNTER — HOSPITAL ENCOUNTER (INPATIENT)
Dept: HOSPITAL 99 - ICU | Age: 80
LOS: 2 days | Discharge: HOME | DRG: 305 | End: 2025-05-25
Payer: COMMERCIAL

## 2025-05-23 VITALS — SYSTOLIC BLOOD PRESSURE: 152 MMHG | DIASTOLIC BLOOD PRESSURE: 91 MMHG

## 2025-05-23 VITALS — SYSTOLIC BLOOD PRESSURE: 153 MMHG | DIASTOLIC BLOOD PRESSURE: 89 MMHG

## 2025-05-23 VITALS — SYSTOLIC BLOOD PRESSURE: 149 MMHG | DIASTOLIC BLOOD PRESSURE: 99 MMHG

## 2025-05-23 VITALS — DIASTOLIC BLOOD PRESSURE: 79 MMHG | SYSTOLIC BLOOD PRESSURE: 121 MMHG

## 2025-05-23 VITALS — SYSTOLIC BLOOD PRESSURE: 169 MMHG | DIASTOLIC BLOOD PRESSURE: 107 MMHG

## 2025-05-23 VITALS — DIASTOLIC BLOOD PRESSURE: 98 MMHG | SYSTOLIC BLOOD PRESSURE: 141 MMHG

## 2025-05-23 VITALS — SYSTOLIC BLOOD PRESSURE: 210 MMHG | DIASTOLIC BLOOD PRESSURE: 118 MMHG

## 2025-05-23 VITALS — DIASTOLIC BLOOD PRESSURE: 105 MMHG | SYSTOLIC BLOOD PRESSURE: 184 MMHG

## 2025-05-23 VITALS — DIASTOLIC BLOOD PRESSURE: 125 MMHG | SYSTOLIC BLOOD PRESSURE: 195 MMHG

## 2025-05-23 VITALS — DIASTOLIC BLOOD PRESSURE: 96 MMHG | SYSTOLIC BLOOD PRESSURE: 133 MMHG

## 2025-05-23 VITALS — SYSTOLIC BLOOD PRESSURE: 134 MMHG | DIASTOLIC BLOOD PRESSURE: 82 MMHG

## 2025-05-23 VITALS — DIASTOLIC BLOOD PRESSURE: 96 MMHG | SYSTOLIC BLOOD PRESSURE: 177 MMHG

## 2025-05-23 VITALS — DIASTOLIC BLOOD PRESSURE: 96 MMHG | SYSTOLIC BLOOD PRESSURE: 163 MMHG

## 2025-05-23 VITALS — DIASTOLIC BLOOD PRESSURE: 96 MMHG | SYSTOLIC BLOOD PRESSURE: 162 MMHG

## 2025-05-23 VITALS — BODY MASS INDEX: 27.5 KG/M2

## 2025-05-23 VITALS — SYSTOLIC BLOOD PRESSURE: 150 MMHG | DIASTOLIC BLOOD PRESSURE: 88 MMHG

## 2025-05-23 VITALS — SYSTOLIC BLOOD PRESSURE: 150 MMHG | DIASTOLIC BLOOD PRESSURE: 94 MMHG

## 2025-05-23 DIAGNOSIS — G62.9: ICD-10-CM

## 2025-05-23 DIAGNOSIS — I16.0: Primary | ICD-10-CM

## 2025-05-23 DIAGNOSIS — N40.2: ICD-10-CM

## 2025-05-23 DIAGNOSIS — F03.90: ICD-10-CM

## 2025-05-23 DIAGNOSIS — N18.4: ICD-10-CM

## 2025-05-23 DIAGNOSIS — E87.5: ICD-10-CM

## 2025-05-23 DIAGNOSIS — E78.00: ICD-10-CM

## 2025-05-23 DIAGNOSIS — Z79.899: ICD-10-CM

## 2025-05-23 DIAGNOSIS — Z85.46: ICD-10-CM

## 2025-05-23 DIAGNOSIS — E87.22: ICD-10-CM

## 2025-05-23 DIAGNOSIS — Q61.3: ICD-10-CM

## 2025-05-23 DIAGNOSIS — D63.1: ICD-10-CM

## 2025-05-23 LAB
ALBUMIN SERPL-MCNC: 3.4 G/DL (ref 3.5–5)
ALP SERPL-CCNC: 68 U/L (ref 38–126)
ALT SERPL-CCNC: 12 U/L (ref 0–50)
AST SERPL-CCNC: 18 U/L (ref 17–59)
BUN SERPL-MCNC: 37 MG/DL (ref 9–20)
CALCIUM SERPL-MCNC: 8.7 MG/DL (ref 8.4–10.2)
CHLORIDE SERPL-SCNC: 111 MMOL/L (ref 98–107)
CO2 SERPL-SCNC: 21 MMOL/L (ref 22–30)
EGFR: 21.47
ESTIMATED CREATININE CLEARANCE: 19 ML/MIN
GLUCOSE - POINT OF CARE: 114 MG/DL (ref 70–99)
GLUCOSE SERPL-MCNC: 96 MG/DL (ref 70–99)
HCT VFR BLD AUTO: 34.3 % (ref 39–52)
HGB BLD-MCNC: 10.8 G/DL (ref 13–18)
MAGNESIUM SERPL-MCNC: 2.1 MG/DL (ref 1.6–2.3)
MCHC RBC AUTO-ENTMCNC: 31.5 G/DL (ref 33–37)
MCV RBC AUTO: 84.9 FL (ref 80–94)
NRBC BLD AUTO-RTO: 0 %
PLATELET # BLD AUTO: 245 10^3/UL (ref 130–400)
POTASSIUM SERPL-SCNC: 5.2 MMOL/L (ref 3.5–5.1)
PROT SERPL-MCNC: 5.9 G/DL (ref 6.3–8.2)
SODIUM SERPL-SCNC: 138 MMOL/L (ref 135–145)
SQUAMOUS URNS QL MICRO: >30 /LPF
TROPONIN I SERPL-MCNC: 0.02 NG/ML
URINE RED BLOOD CELL: (no result) /HPF (ref 0–2)
URINE WHITE CELL: (no result) /HPF (ref 0–5)

## 2025-05-23 RX ADMIN — NICARDIPINE HYDROCHLORIDE 200: 0.2 INJECTION INTRAVENOUS at 20:27

## 2025-05-24 VITALS — SYSTOLIC BLOOD PRESSURE: 152 MMHG | DIASTOLIC BLOOD PRESSURE: 103 MMHG

## 2025-05-24 VITALS — SYSTOLIC BLOOD PRESSURE: 142 MMHG | DIASTOLIC BLOOD PRESSURE: 79 MMHG

## 2025-05-24 VITALS — SYSTOLIC BLOOD PRESSURE: 159 MMHG | HEART RATE: 87 BPM | DIASTOLIC BLOOD PRESSURE: 92 MMHG | OXYGEN SATURATION: 96 %

## 2025-05-24 VITALS — DIASTOLIC BLOOD PRESSURE: 108 MMHG | SYSTOLIC BLOOD PRESSURE: 200 MMHG

## 2025-05-24 VITALS — DIASTOLIC BLOOD PRESSURE: 99 MMHG | SYSTOLIC BLOOD PRESSURE: 155 MMHG

## 2025-05-24 VITALS — DIASTOLIC BLOOD PRESSURE: 82 MMHG | SYSTOLIC BLOOD PRESSURE: 122 MMHG

## 2025-05-24 VITALS — DIASTOLIC BLOOD PRESSURE: 63 MMHG | SYSTOLIC BLOOD PRESSURE: 119 MMHG

## 2025-05-24 VITALS — SYSTOLIC BLOOD PRESSURE: 136 MMHG | DIASTOLIC BLOOD PRESSURE: 79 MMHG

## 2025-05-24 VITALS — DIASTOLIC BLOOD PRESSURE: 111 MMHG | SYSTOLIC BLOOD PRESSURE: 164 MMHG

## 2025-05-24 VITALS — SYSTOLIC BLOOD PRESSURE: 113 MMHG | DIASTOLIC BLOOD PRESSURE: 79 MMHG

## 2025-05-24 VITALS — DIASTOLIC BLOOD PRESSURE: 85 MMHG | SYSTOLIC BLOOD PRESSURE: 137 MMHG

## 2025-05-24 VITALS — SYSTOLIC BLOOD PRESSURE: 167 MMHG | DIASTOLIC BLOOD PRESSURE: 99 MMHG

## 2025-05-24 VITALS — DIASTOLIC BLOOD PRESSURE: 90 MMHG | SYSTOLIC BLOOD PRESSURE: 162 MMHG

## 2025-05-24 VITALS — SYSTOLIC BLOOD PRESSURE: 167 MMHG | DIASTOLIC BLOOD PRESSURE: 98 MMHG

## 2025-05-24 VITALS — SYSTOLIC BLOOD PRESSURE: 145 MMHG | DIASTOLIC BLOOD PRESSURE: 99 MMHG

## 2025-05-24 VITALS — DIASTOLIC BLOOD PRESSURE: 66 MMHG | SYSTOLIC BLOOD PRESSURE: 116 MMHG

## 2025-05-24 VITALS — DIASTOLIC BLOOD PRESSURE: 90 MMHG | SYSTOLIC BLOOD PRESSURE: 156 MMHG

## 2025-05-24 VITALS — SYSTOLIC BLOOD PRESSURE: 145 MMHG | DIASTOLIC BLOOD PRESSURE: 93 MMHG

## 2025-05-24 VITALS — SYSTOLIC BLOOD PRESSURE: 126 MMHG | DIASTOLIC BLOOD PRESSURE: 77 MMHG

## 2025-05-24 VITALS — SYSTOLIC BLOOD PRESSURE: 155 MMHG | DIASTOLIC BLOOD PRESSURE: 94 MMHG

## 2025-05-24 VITALS — SYSTOLIC BLOOD PRESSURE: 120 MMHG | DIASTOLIC BLOOD PRESSURE: 79 MMHG

## 2025-05-24 VITALS — DIASTOLIC BLOOD PRESSURE: 91 MMHG | SYSTOLIC BLOOD PRESSURE: 155 MMHG

## 2025-05-24 VITALS — DIASTOLIC BLOOD PRESSURE: 91 MMHG | SYSTOLIC BLOOD PRESSURE: 156 MMHG

## 2025-05-24 VITALS — SYSTOLIC BLOOD PRESSURE: 141 MMHG | DIASTOLIC BLOOD PRESSURE: 73 MMHG

## 2025-05-24 VITALS — DIASTOLIC BLOOD PRESSURE: 84 MMHG | SYSTOLIC BLOOD PRESSURE: 130 MMHG

## 2025-05-24 VITALS — SYSTOLIC BLOOD PRESSURE: 169 MMHG | DIASTOLIC BLOOD PRESSURE: 99 MMHG

## 2025-05-24 VITALS — DIASTOLIC BLOOD PRESSURE: 93 MMHG | SYSTOLIC BLOOD PRESSURE: 134 MMHG

## 2025-05-24 VITALS — DIASTOLIC BLOOD PRESSURE: 86 MMHG | SYSTOLIC BLOOD PRESSURE: 161 MMHG

## 2025-05-24 VITALS — DIASTOLIC BLOOD PRESSURE: 99 MMHG | SYSTOLIC BLOOD PRESSURE: 152 MMHG

## 2025-05-24 VITALS — SYSTOLIC BLOOD PRESSURE: 160 MMHG | DIASTOLIC BLOOD PRESSURE: 106 MMHG

## 2025-05-24 VITALS — SYSTOLIC BLOOD PRESSURE: 140 MMHG | DIASTOLIC BLOOD PRESSURE: 84 MMHG

## 2025-05-24 VITALS — SYSTOLIC BLOOD PRESSURE: 135 MMHG | DIASTOLIC BLOOD PRESSURE: 90 MMHG

## 2025-05-24 VITALS — DIASTOLIC BLOOD PRESSURE: 83 MMHG | SYSTOLIC BLOOD PRESSURE: 130 MMHG

## 2025-05-24 VITALS — DIASTOLIC BLOOD PRESSURE: 79 MMHG | SYSTOLIC BLOOD PRESSURE: 131 MMHG | BODY MASS INDEX: 24.1 KG/M2

## 2025-05-24 VITALS — SYSTOLIC BLOOD PRESSURE: 160 MMHG | DIASTOLIC BLOOD PRESSURE: 103 MMHG

## 2025-05-24 VITALS — SYSTOLIC BLOOD PRESSURE: 162 MMHG | DIASTOLIC BLOOD PRESSURE: 101 MMHG

## 2025-05-24 VITALS — DIASTOLIC BLOOD PRESSURE: 92 MMHG | SYSTOLIC BLOOD PRESSURE: 117 MMHG

## 2025-05-24 VITALS — SYSTOLIC BLOOD PRESSURE: 136 MMHG | DIASTOLIC BLOOD PRESSURE: 80 MMHG

## 2025-05-24 VITALS — DIASTOLIC BLOOD PRESSURE: 97 MMHG | SYSTOLIC BLOOD PRESSURE: 184 MMHG

## 2025-05-24 VITALS — DIASTOLIC BLOOD PRESSURE: 90 MMHG | SYSTOLIC BLOOD PRESSURE: 144 MMHG

## 2025-05-24 VITALS — SYSTOLIC BLOOD PRESSURE: 156 MMHG | DIASTOLIC BLOOD PRESSURE: 92 MMHG

## 2025-05-24 VITALS — DIASTOLIC BLOOD PRESSURE: 87 MMHG | SYSTOLIC BLOOD PRESSURE: 142 MMHG

## 2025-05-24 VITALS — DIASTOLIC BLOOD PRESSURE: 93 MMHG | SYSTOLIC BLOOD PRESSURE: 156 MMHG

## 2025-05-24 VITALS — DIASTOLIC BLOOD PRESSURE: 98 MMHG | SYSTOLIC BLOOD PRESSURE: 135 MMHG

## 2025-05-24 VITALS — DIASTOLIC BLOOD PRESSURE: 89 MMHG | SYSTOLIC BLOOD PRESSURE: 135 MMHG

## 2025-05-24 VITALS — DIASTOLIC BLOOD PRESSURE: 80 MMHG | SYSTOLIC BLOOD PRESSURE: 135 MMHG

## 2025-05-24 VITALS — DIASTOLIC BLOOD PRESSURE: 85 MMHG | SYSTOLIC BLOOD PRESSURE: 122 MMHG

## 2025-05-24 VITALS — SYSTOLIC BLOOD PRESSURE: 152 MMHG | HEART RATE: 89 BPM | DIASTOLIC BLOOD PRESSURE: 92 MMHG

## 2025-05-24 VITALS — DIASTOLIC BLOOD PRESSURE: 79 MMHG | SYSTOLIC BLOOD PRESSURE: 119 MMHG

## 2025-05-24 VITALS — SYSTOLIC BLOOD PRESSURE: 153 MMHG | DIASTOLIC BLOOD PRESSURE: 97 MMHG

## 2025-05-24 VITALS — DIASTOLIC BLOOD PRESSURE: 80 MMHG | SYSTOLIC BLOOD PRESSURE: 125 MMHG

## 2025-05-24 LAB
APTT PPP: 31.3 SEC (ref 23.4–35)
BUN SERPL-MCNC: 36 MG/DL (ref 9–20)
CALCIUM SERPL-MCNC: 8.8 MG/DL (ref 8.4–10.2)
CHLORIDE SERPL-SCNC: 115 MMOL/L (ref 98–107)
CO2 SERPL-SCNC: 21 MMOL/L (ref 22–30)
EGFR: 22.39
ERYTHROCYTE [DISTWIDTH] IN BLOOD BY AUTOMATED COUNT: 14.9 % (ref 11.5–14.5)
ESTIMATED CREATININE CLEARANCE: 20 ML/MIN
GLUCOSE SERPL-MCNC: 96 MG/DL (ref 70–99)
HCT VFR BLD AUTO: 33.9 % (ref 39–52)
HGB BLD-MCNC: 11.1 G/DL (ref 13–18)
MAGNESIUM SERPL-MCNC: 2.2 MG/DL (ref 1.6–2.3)
MCHC RBC AUTO-ENTMCNC: 32.7 G/DL (ref 33–37)
MCV RBC AUTO: 82.7 FL (ref 80–94)
PLATELET # BLD AUTO: 239 10^3/UL (ref 130–400)
POTASSIUM SERPL-SCNC: 5.5 MMOL/L (ref 3.5–5.1)
PROTHROMBIN TIME: 12.5 SEC (ref 11.4–14.6)
SODIUM SERPL-SCNC: 142 MMOL/L (ref 135–145)
SQUAMOUS URNS QL MICRO: >30 /LPF
URINE RED BLOOD CELL: (no result) /HPF (ref 0–2)

## 2025-05-24 RX ADMIN — HEPARIN SODIUM 5000 UNITS: 5000 INJECTION, SOLUTION INTRAVENOUS; SUBCUTANEOUS at 08:57

## 2025-05-24 RX ADMIN — HEPARIN SODIUM 5000 UNITS: 5000 INJECTION, SOLUTION INTRAVENOUS; SUBCUTANEOUS at 01:15

## 2025-05-24 RX ADMIN — NIFEDIPINE 30 MG: 30 TABLET, EXTENDED RELEASE ORAL at 12:04

## 2025-05-24 RX ADMIN — SODIUM BICARBONATE 650 MG: 650 TABLET ORAL at 08:57

## 2025-05-24 RX ADMIN — SODIUM ZIRCONIUM CYCLOSILICATE 10 GRAM: 10 POWDER, FOR SUSPENSION ORAL at 12:04

## 2025-05-24 RX ADMIN — ACETAMINOPHEN 650 MG: 325 TABLET ORAL at 13:05

## 2025-05-24 RX ADMIN — SODIUM BICARBONATE 650 MG: 650 TABLET ORAL at 19:22

## 2025-05-24 RX ADMIN — HEPARIN SODIUM 5000 UNITS: 5000 INJECTION, SOLUTION INTRAVENOUS; SUBCUTANEOUS at 16:33

## 2025-05-25 VITALS — SYSTOLIC BLOOD PRESSURE: 136 MMHG | DIASTOLIC BLOOD PRESSURE: 85 MMHG

## 2025-05-25 VITALS — DIASTOLIC BLOOD PRESSURE: 99 MMHG | SYSTOLIC BLOOD PRESSURE: 161 MMHG

## 2025-05-25 VITALS — SYSTOLIC BLOOD PRESSURE: 152 MMHG | DIASTOLIC BLOOD PRESSURE: 107 MMHG

## 2025-05-25 VITALS — SYSTOLIC BLOOD PRESSURE: 159 MMHG | DIASTOLIC BLOOD PRESSURE: 97 MMHG

## 2025-05-25 VITALS — DIASTOLIC BLOOD PRESSURE: 73 MMHG | SYSTOLIC BLOOD PRESSURE: 118 MMHG

## 2025-05-25 VITALS — SYSTOLIC BLOOD PRESSURE: 165 MMHG | DIASTOLIC BLOOD PRESSURE: 95 MMHG

## 2025-05-25 VITALS — SYSTOLIC BLOOD PRESSURE: 140 MMHG | DIASTOLIC BLOOD PRESSURE: 88 MMHG

## 2025-05-25 VITALS — DIASTOLIC BLOOD PRESSURE: 87 MMHG | SYSTOLIC BLOOD PRESSURE: 139 MMHG

## 2025-05-25 VITALS — SYSTOLIC BLOOD PRESSURE: 136 MMHG | DIASTOLIC BLOOD PRESSURE: 77 MMHG

## 2025-05-25 VITALS — DIASTOLIC BLOOD PRESSURE: 95 MMHG | SYSTOLIC BLOOD PRESSURE: 148 MMHG

## 2025-05-25 VITALS — SYSTOLIC BLOOD PRESSURE: 131 MMHG | DIASTOLIC BLOOD PRESSURE: 90 MMHG

## 2025-05-25 VITALS — DIASTOLIC BLOOD PRESSURE: 93 MMHG | SYSTOLIC BLOOD PRESSURE: 155 MMHG

## 2025-05-25 VITALS — SYSTOLIC BLOOD PRESSURE: 136 MMHG | DIASTOLIC BLOOD PRESSURE: 80 MMHG

## 2025-05-25 VITALS — SYSTOLIC BLOOD PRESSURE: 122 MMHG | DIASTOLIC BLOOD PRESSURE: 87 MMHG

## 2025-05-25 VITALS — BODY MASS INDEX: 23.9 KG/M2

## 2025-05-25 VITALS — DIASTOLIC BLOOD PRESSURE: 80 MMHG | SYSTOLIC BLOOD PRESSURE: 142 MMHG

## 2025-05-25 LAB
BUN SERPL-MCNC: 37 MG/DL (ref 9–20)
CALCIUM SERPL-MCNC: 8.7 MG/DL (ref 8.4–10.2)
CHLORIDE SERPL-SCNC: 112 MMOL/L (ref 98–107)
CO2 SERPL-SCNC: 21 MMOL/L (ref 22–30)
EGFR: 21.47
ERYTHROCYTE [DISTWIDTH] IN BLOOD BY AUTOMATED COUNT: 14.6 % (ref 11.5–14.5)
ESTIMATED CREATININE CLEARANCE: 19 ML/MIN
GLUCOSE SERPL-MCNC: 92 MG/DL (ref 70–99)
HCT VFR BLD AUTO: 34.8 % (ref 39–52)
HGB BLD-MCNC: 10.9 G/DL (ref 13–18)
MCHC RBC AUTO-ENTMCNC: 31.3 G/DL (ref 33–37)
MCV RBC AUTO: 83.9 FL (ref 80–94)
PLATELET # BLD AUTO: 255 10^3/UL (ref 130–400)
POTASSIUM SERPL-SCNC: 4.8 MMOL/L (ref 3.5–5.1)
SODIUM SERPL-SCNC: 139 MMOL/L (ref 135–145)

## 2025-05-25 RX ADMIN — SODIUM BICARBONATE 650 MG: 650 TABLET ORAL at 07:51

## 2025-05-25 RX ADMIN — NIFEDIPINE 30 MG: 30 TABLET, EXTENDED RELEASE ORAL at 07:50

## 2025-05-25 RX ADMIN — HEPARIN SODIUM 5000 UNITS: 5000 INJECTION, SOLUTION INTRAVENOUS; SUBCUTANEOUS at 00:07

## 2025-05-25 RX ADMIN — HEPARIN SODIUM 5000 UNITS: 5000 INJECTION, SOLUTION INTRAVENOUS; SUBCUTANEOUS at 07:50

## 2025-05-28 LAB
ALBUMIN SERPL ELPH-MCNC: 3.69 G/DL (ref 3.75–5.01)
PROT SERPL-MCNC: 6.6 G/DL (ref 6.3–8.2)